# Patient Record
(demographics unavailable — no encounter records)

---

## 2025-05-21 NOTE — PHYSICAL EXAM
[de-identified] : General: No distress, well nourished Eyes: Normal Sclera, EOMI, PERRL ENT: Normal appearance of the nose, normal oropharynx Neck/Thyroid: No cervical lymphadenopathy, thyroid gland 20 g in size, no thyroid nodules, non-tender Respiratory: No use of accessory muscles of respiration, vesicular breath sounds heard bilaterally, no crepitations or rhonchi Cardiovascular: S1 and S2 heard and normal, no S3 or S4, no murmurs, radial pulse normal bilaterally Abdomen: soft, non-tender, no masses, normal bowel sounds Musculoskeletal: No swelling or deformities of joints of hands, no pedal edema Neurological: Normal range of motion in the hands, Normal brachioradialis reflexes bilaterally Psychiatry: Patient converses normally, good judgement and insight Skin: No rashes in hands, no nodules palpated in hands  [de-identified] : DM foot exam done on 05/21/2025: No ulcers seen in feet, has calluses in feet Dorsalis pedis pulses normal bilaterally Sensation to 10 g monofilament normal in feet bilaterally

## 2025-05-21 NOTE — ASSESSMENT
[FreeTextEntry1] : Target: HbA1c < 7%, BP < 130/80  HbA1c is above goal but patient was not compliant with his DM medications - patient is compliant with his BG medications now - BG in clinic was 202 today. I increased the dose of Farxiga. BP is above goal so I prescribed olmesartan 20 mg po daily  No indication for Aspirin or ACEi/ARB - I PRESCRIBED STATIN ON 05/21/2025  Last lipid panel - April 2025 - , Trig 124 Last HbA1c - 04/12/2025 - 12.4% Last Vitamin B12 - None seen Last urine albumin panel - April 2025 neg Last BMP/CMP - April 2025 - Cr, K, AST, ALT N  Plan: 1. Start atorvastatin 40 mg po daily 2. Start olmesartan 20 mg po daily 3. Start sildenafil 50 mg po prn 15 minutes before sexual activity - patient may increase this to 100 mg if needed 4. Fingersticks to be done once daily - I also ordered the DEX[employer] CGM for the patient and he may also use the [employer] CGM  5. Labs to be done in 6 weeks - see below 6. Follow up in 6 weeks to review results and meter 7. Patient did not want to follow up with DM educator

## 2025-05-21 NOTE — HISTORY OF PRESENT ILLNESS
[FreeTextEntry1] : Problems: 1. DM type 2 2. Hyperlipidemia 3. Tobacco dependence 4. Erectile dysfunction      DM type 2 1. Diagnosed in 2023 2. Meds: Metformin 500 mg po bid - no side effects Farxiga 5 mg po daily - no UTIs or genital candidiasis 3. Fingersticks not done, no hypoglycemic unawareness 4. Not on Aspirin, not on statin - I PRESCRIBED ATORVASTATIN 40 MG PO DAILY, not ACEi/ARB - I PRESCRIBED OMLESARTAN 20 MG PO DAILY ON 05/21/2025 5. Complications: No DM nephropathy (normal creatinine) No DM retinopathy (last eye exam was in May 2025, patient advised on the need for annual DM eye exam) No ASCVD No foot ulcers/amputation 6. Patient smokes cigarettes - smokes about 1 pack of cigarettes per day since the age of 16 years old - The patient was advised that smoking increases the risk of myocardial infarctions and strokes (especially in a person with diabetes) and also increases the risk of lung cancer. Smoking cessation techniques were discussed with the patient.    Erectile dysfunction 1. Patient is not on nitrates 2. I prescribed SILDENAFIL 50 MG PO PRN ON 05/21/2025